# Patient Record
Sex: MALE | Race: WHITE | NOT HISPANIC OR LATINO | Employment: OTHER | URBAN - METROPOLITAN AREA
[De-identification: names, ages, dates, MRNs, and addresses within clinical notes are randomized per-mention and may not be internally consistent; named-entity substitution may affect disease eponyms.]

---

## 2017-11-21 ENCOUNTER — ESTABLISHED COMPREHENSIVE EXAM (OUTPATIENT)
Dept: URBAN - METROPOLITAN AREA CLINIC 43 | Facility: CLINIC | Age: 72
End: 2017-11-21

## 2017-11-21 DIAGNOSIS — H43.813: ICD-10-CM

## 2017-11-21 DIAGNOSIS — H04.123: ICD-10-CM

## 2017-11-21 DIAGNOSIS — H35.363: ICD-10-CM

## 2017-11-21 DIAGNOSIS — Z96.1: ICD-10-CM

## 2017-11-21 PROCEDURE — G8756 NO BP MEASURE DOC: HCPCS

## 2017-11-21 PROCEDURE — 92014 COMPRE OPH EXAM EST PT 1/>: CPT

## 2017-11-21 PROCEDURE — 1036F TOBACCO NON-USER: CPT

## 2017-11-21 PROCEDURE — 92015 DETERMINE REFRACTIVE STATE: CPT

## 2017-11-21 PROCEDURE — G8427 DOCREV CUR MEDS BY ELIG CLIN: HCPCS

## 2017-11-21 ASSESSMENT — VISUAL ACUITY
OD_SC: 20/30-2
OD_SC: J12
OS_SC: >J12
OD_BAT: 20/70
OS_SC: 20/40-1
OS_BAT: 20/100

## 2017-11-21 ASSESSMENT — TONOMETRY
OS_IOP_MMHG: 15
OD_IOP_MMHG: 14

## 2018-10-30 NOTE — PATIENT DISCUSSION
Eyelid Lesion Counseling: The patient has presented with one or more eyelid or facial lesions with growth, change, irritation or abnormal appearance. A biopsy was recommended, completed and  sent to pathology for definitive diagnosis. The patient has been given post procedure written instructions. The patient understands that the results of the pathology report will be available after one week to  two weeks . The patient  understands that an attempt to call and inform them of the result will be made within 2 weeks. If they do not receive a call they understand that they should contact our office for follow up.

## 2018-10-30 NOTE — PATIENT DISCUSSION
Biopsy Lesion Eyelid: The patient had a lesion on the left lower eyelid. After informed consent the patient received a local anesthetic injection of 1% lidocaine with epinephrine 1:100,000 units. A section of the mass was excised with Isael scissors and sent to the pathology laboratory for evaluation. The patient was given written post operative care instructions and a prescription for antibiotic ointment. The patient was asked to call our office within 10 days for follow up if the patient had not heard from our office regarding the result of the biopsy before that time.

## 2018-11-15 ENCOUNTER — ESTABLISHED COMPREHENSIVE EXAM (OUTPATIENT)
Dept: URBAN - METROPOLITAN AREA CLINIC 43 | Facility: CLINIC | Age: 73
End: 2018-11-15

## 2018-11-15 DIAGNOSIS — Z96.1: ICD-10-CM

## 2018-11-15 DIAGNOSIS — H35.363: ICD-10-CM

## 2018-11-15 DIAGNOSIS — H04.123: ICD-10-CM

## 2018-11-15 DIAGNOSIS — H43.813: ICD-10-CM

## 2018-11-15 PROCEDURE — 92015 DETERMINE REFRACTIVE STATE: CPT

## 2018-11-15 PROCEDURE — G9903 PT SCRN TBCO ID AS NON USER: HCPCS

## 2018-11-15 PROCEDURE — 1036F TOBACCO NON-USER: CPT

## 2018-11-15 PROCEDURE — 92014 COMPRE OPH EXAM EST PT 1/>: CPT

## 2018-11-15 PROCEDURE — G8427 DOCREV CUR MEDS BY ELIG CLIN: HCPCS

## 2018-11-15 PROCEDURE — G8756 NO BP MEASURE DOC: HCPCS

## 2018-11-15 ASSESSMENT — VISUAL ACUITY
OS_SC: J12
OS_SC: 20/40
OD_SC: J12
OD_SC: 20/25-3
OD_BAT: 20/60
OS_BAT: 20/50

## 2018-11-15 ASSESSMENT — TONOMETRY
OD_IOP_MMHG: 15
OS_IOP_MMHG: 15

## 2019-02-08 NOTE — PATIENT DISCUSSION
Also, please do not hesitate to call us if you have any concerns not addressed by this information. Please call [phone] and we will do everything we can to help you during this period.

## 2019-11-22 ENCOUNTER — ESTABLISHED COMPREHENSIVE EXAM (OUTPATIENT)
Dept: URBAN - METROPOLITAN AREA CLINIC 43 | Facility: CLINIC | Age: 74
End: 2019-11-22

## 2019-11-22 DIAGNOSIS — H35.3131: ICD-10-CM

## 2019-11-22 DIAGNOSIS — H04.123: ICD-10-CM

## 2019-11-22 DIAGNOSIS — H52.4: ICD-10-CM

## 2019-11-22 DIAGNOSIS — Z96.1: ICD-10-CM

## 2019-11-22 DIAGNOSIS — H43.813: ICD-10-CM

## 2019-11-22 PROCEDURE — 92015 DETERMINE REFRACTIVE STATE: CPT

## 2019-11-22 PROCEDURE — 92014 COMPRE OPH EXAM EST PT 1/>: CPT

## 2019-11-22 ASSESSMENT — VISUAL ACUITY
OD_BAT: >20/400
OD_SC: 20/25-1
OS_PH: 20/40-2
OD_SC: J12-
OS_BAT: >20/400
OS_SC: 20/50-1
OS_SC: J12-

## 2019-11-22 ASSESSMENT — TONOMETRY
OS_IOP_MMHG: 14
OD_IOP_MMHG: 14